# Patient Record
Sex: MALE | Race: WHITE | NOT HISPANIC OR LATINO | URBAN - METROPOLITAN AREA
[De-identification: names, ages, dates, MRNs, and addresses within clinical notes are randomized per-mention and may not be internally consistent; named-entity substitution may affect disease eponyms.]

---

## 2023-05-24 ENCOUNTER — EMERGENCY (EMERGENCY)
Facility: HOSPITAL | Age: 28
LOS: 1 days | Discharge: HOME | End: 2023-05-24
Attending: EMERGENCY MEDICINE | Admitting: EMERGENCY MEDICINE
Payer: COMMERCIAL

## 2023-05-24 VITALS
WEIGHT: 218.26 LBS | HEART RATE: 75 BPM | SYSTOLIC BLOOD PRESSURE: 122 MMHG | OXYGEN SATURATION: 99 % | TEMPERATURE: 98 F | RESPIRATION RATE: 18 BRPM | HEIGHT: 70 IN | DIASTOLIC BLOOD PRESSURE: 82 MMHG

## 2023-05-24 PROCEDURE — 73130 X-RAY EXAM OF HAND: CPT

## 2023-05-24 PROCEDURE — 99284 EMERGENCY DEPT VISIT MOD MDM: CPT

## 2023-05-24 PROCEDURE — 99283 EMERGENCY DEPT VISIT LOW MDM: CPT | Mod: 25

## 2023-05-24 PROCEDURE — 90471 IMMUNIZATION ADMIN: CPT

## 2023-05-24 PROCEDURE — 73130 X-RAY EXAM OF HAND: CPT | Mod: 26,RT

## 2023-05-24 PROCEDURE — 90715 TDAP VACCINE 7 YRS/> IM: CPT

## 2023-05-24 RX ORDER — TETANUS TOXOID, REDUCED DIPHTHERIA TOXOID AND ACELLULAR PERTUSSIS VACCINE, ADSORBED 5; 2.5; 8; 8; 2.5 [IU]/.5ML; [IU]/.5ML; UG/.5ML; UG/.5ML; UG/.5ML
0.5 SUSPENSION INTRAMUSCULAR ONCE
Refills: 0 | Status: COMPLETED | OUTPATIENT
Start: 2023-05-24 | End: 2023-05-24

## 2023-05-24 RX ADMIN — TETANUS TOXOID, REDUCED DIPHTHERIA TOXOID AND ACELLULAR PERTUSSIS VACCINE, ADSORBED 0.5 MILLILITER(S): 5; 2.5; 8; 8; 2.5 SUSPENSION INTRAMUSCULAR at 21:15

## 2023-05-24 NOTE — ED PROVIDER NOTE - MUSCULOSKELETAL, MLM
R hand: +ttp with ecchymosis and swelling noted to palmar aspect of right 3rd and 4th distal phalanges, FROM all fingers, nail intact, no subungual hematoma noted, cap refill<2sec, distal sensation intact, pulses intact, rest of the fingers/hand NT with FROM, NVI

## 2023-05-24 NOTE — ED PROVIDER NOTE - CLINICAL SUMMARY MEDICAL DECISION MAKING FREE TEXT BOX
Patient with crush injury to distal portion of right 3rd/4th fingers. Has swelling of dital phalnges of those fingers. Will get xray and update Td

## 2023-05-24 NOTE — ED ADULT NURSE NOTE - OBJECTIVE STATEMENT
Ambulatory to ER w/ c/o injury to 3rd and 4th fingers of right hand s/p "smashed my fingers in the tail gate of my truck". Injury occurred at 12:20 p.m. Pain on ROM. (+) pulses, (+) sensation. Abrasions observed to fingers. Ambulatory to ER w/ c/o injury to 3rd and 4th fingers of right hand s/p "smashed my fingers in the tail gate of my truck". Injury occurred at 12:20 p.m. Ice applied at triage. Pain on ROM. (+) pulses, (+) sensation. Abrasion observed to tip of 4th finger. x-rays done. Fingers daniel taped by DENIS Chavez. Wound care instructions given. Verbalized understanding.

## 2023-05-24 NOTE — ED ADULT NURSE NOTE - NSFALLUNIVINTERV_ED_ALL_ED
Bed/Stretcher in lowest position, wheels locked, appropriate side rails in place/Call bell, personal items and telephone in reach/Instruct patient to call for assistance before getting out of bed/chair/stretcher/Non-slip footwear applied when patient is off stretcher/[place] to call system/Physically safe environment - no spills, clutter or unnecessary equipment/Purposeful proactive rounding/Room/bathroom lighting operational, light cord in reach

## 2023-05-24 NOTE — ED PROVIDER NOTE - ADDITIONAL NOTES AND INSTRUCTIONS:
You may return to work tomorrow. You may return to work tomorrow. You may resume full duty as tolerated. You may return to work tomorrow. You may resume full duty without restrictions.

## 2023-05-24 NOTE — ED PROVIDER NOTE - CARE PROVIDER_API CALL
Matt Monaco)  Orthopaedic Surgery; Surgery of the Hand  [address] [place] [address]  Phone: [phone]  Fax: [phone]  Follow Up Time: 1-3 Days

## 2023-05-24 NOTE — ED PROVIDER NOTE - NSFOLLOWUPINSTRUCTIONS_ED_ALL_ED_FT
Follow-up with hand specialist for reevaluation, ongoing care and treatment.  Rest, keep fingers elevated, ice compresses, take Tylenol or Motrin as directed for pain.  If having worsening symptoms of abdominal symptoms, return to the ER immediately.    Finger Fracture, Adult  A finger fracture is a break in any of the bones in your fingers. Your doctor may put a splint or cast on your finger so it will not move while it heals.    What are the causes?  The main cause of a finger fracture is injury, such as from playing sports, falling, or closing your finger in a drawer or door.    What increases the risk?  You may be more likely to get this condition:  Playing sports.  Working with machinery.  If you have weak bones (osteoporosis).  What are the signs or symptoms?  The main symptoms of a fractured finger are pain, bruising, and swelling shortly after the injury.    How is this treated?  Treatment depends on how severe your fracture is. If the bones are still in place, your doctor may put your finger in a splint. If many fingers are fractured, you may need a cast. A cast may be placed up to the elbow. This will keep your fingers and hand from moving.    If the bones are out of place, your doctor may move them back into place by hand or by surgery.    You may also need to do physical therapy exercises to help your finger move better and get stronger.    Follow these instructions at home:  If you have a removable splint:    Hand showing finger splint on index and middle fingers and wrist.  Wear the splint as told by your doctor. Take it off only as told by your doctor.  Check the skin around the splint every day. Tell your doctor if you see problems.  Loosen the splint if your fingers:  Tingle.  Become numb.  Turn cold and blue.  Keep the splint clean and dry.  If you have a nonremovable cast:    Do not put pressure on any part of the cast until it is fully hardened.  Do not stick anything inside the cast to scratch your skin.  Check the skin around the cast every day. Tell your doctor if you see problems.  You may put lotion on dry skin around the cast. Do not put lotion on the skin under the cast.  Keep the cast clean and dry.  Bathing    Do not take baths, swim, or use a hot tub. Ask your doctor if you may take showers.  If the splint or cast is not waterproof:  Do not let it get wet.  Cover it with a watertight covering when you take a bath or shower.  Managing pain, stiffness, and swelling    If told, put ice on the injured area. To do this:  If you have a removable splint, take it off as told by your doctor.  Put ice in a plastic bag.  Place a towel between your skin and the bag, or between your cast and the bag.  Leave the ice on for 20 minutes, 2–3 times a day.  Take off the ice if your skin turns bright red. This is very important. If you cannot feel pain, heat, or cold, you have a greater risk of damage to the area.  Move your fingers often.  Raise the injured area above the level of your heart while you are sitting or lying down.  Activity    Ask your doctor if you should avoid driving or using machines while you are taking your medicine.  Do physical therapy exercises as told by your doctor.  Return to your normal activities when your doctor says that it is safe.  Ask your doctor when it is safe to drive if you have a splint or cast on your finger.  General instructions    Do not smoke or use any products that contain nicotine or tobacco. Using these products can make it take longer for your bones to heal. If you need help quitting, ask your doctor.  Take over-the-counter and prescription medicines only as told by your doctor.  Keep all follow-up visits.  Contact a doctor if:  Your pain or swelling gets worse, even with treatment.  You have trouble moving your finger.  Get help right away if:  Your finger gets numb or turns blue.  Summary  A finger fracture is a break in any of the bones in your fingers.  You may need to wear a splint or cast on your finger, so it will not move while it heals.  If told, put ice on the injured area for 20 minutes, 2–3 times a day.  This information is not intended to replace advice given to you by your health care provider. Make sure you discuss any questions you have with your health care provider.

## 2023-05-24 NOTE — ED PROVIDER NOTE - ATTENDING APP SHARED VISIT CONTRIBUTION OF CARE
Santy Dewitt MD: I have personally performed a face to face diagnostic evaluation on this patient.  I have reviewed the PA note and agree with the history, exam, and plan of care, except as noted.  History and Exam by me shows same findings as documented

## 2023-05-24 NOTE — ED PROVIDER NOTE - OBJECTIVE STATEMENT
27-year-old male with complaint of right third and fourth finger pain status post injury today.  Patient states that the tip of his right third and fourth fingers got crushed in the tailgate of his truck at work around 12:20 PM today.  Patient is left-hand dominant.  Denies taking any pain medications.  Unsure of last tetanus.  Denies numbness, tingling, weakness.  Patient declined pain medications.

## 2023-05-24 NOTE — ED PROVIDER NOTE - NS ED ATTENDING STATEMENT MOD
Attending Only This was a shared visit with the GARCÍA. I reviewed and verified the documentation and independently performed the documented:

## 2023-05-24 NOTE — ED PROVIDER NOTE - SKIN, MLM
+abrasion noted to distal palmar right 4th finger, no active bleeding noted, +bruising noted to 3rd and 4th distal phalanges

## 2023-05-24 NOTE — ED ADULT NURSE NOTE - CHPI ED NUR SYMPTOMS NEG
no back pain/no deformity/no difficulty bearing weight/no fever/no numbness/no stiffness/no tingling/no weakness

## 2023-05-24 NOTE — ED ADULT NURSE NOTE - RADIATION
Comments: Pt restarted XTRAC at 200 dose and was made aware of side effects such as erythema, tenderness. no radiation

## 2023-05-24 NOTE — ED PROVIDER NOTE - PATIENT PORTAL LINK FT
You can access the FollowMyHealth Patient Portal offered by [facility] by registering at the following website: [email or web address]. By joining [employer]’s FollowMyHealth portal, you will also be able to view your health information using other applications (apps) compatible with our system.
